# Patient Record
Sex: FEMALE | URBAN - METROPOLITAN AREA
[De-identification: names, ages, dates, MRNs, and addresses within clinical notes are randomized per-mention and may not be internally consistent; named-entity substitution may affect disease eponyms.]

---

## 2020-09-04 ENCOUNTER — TELEPHONE (OUTPATIENT)
Dept: RHEUMATOLOGY | Facility: CLINIC | Age: 50
End: 2020-09-04

## 2020-09-04 NOTE — TELEPHONE ENCOUNTER
Returned patient call regarding new patient appointment. Patient stated only wants to see Dr Elliott. Nurse informed of first available, scheduled appointment and added to wait list. Patient aware of date and time of appointment.

## 2020-09-04 NOTE — TELEPHONE ENCOUNTER
----- Message from Miguelangel Peralta sent at 9/4/2020  2:55 PM CDT -----  Regarding: F/U on a referral  Type: Needs Medical Advice    Who Called:  Ptnt  [phone]    Additional Information:     Advised F/U on a referral from Dr Tawnya Reynaga with [facility] in [place]. Please advise.